# Patient Record
Sex: FEMALE | Race: WHITE | NOT HISPANIC OR LATINO | Employment: OTHER | URBAN - METROPOLITAN AREA
[De-identification: names, ages, dates, MRNs, and addresses within clinical notes are randomized per-mention and may not be internally consistent; named-entity substitution may affect disease eponyms.]

---

## 2019-05-04 PROBLEM — S72.001A CLOSED FRACTURE OF RIGHT HIP: Status: ACTIVE | Noted: 2019-05-04

## 2019-05-04 PROBLEM — E87.1 HYPONATREMIA: Status: ACTIVE | Noted: 2019-05-04

## 2019-05-08 PROBLEM — Z87.81 S/P RIGHT HIP FRACTURE: Status: ACTIVE | Noted: 2019-05-08

## 2022-02-25 ENCOUNTER — PATIENT OUTREACH (OUTPATIENT)
Dept: ADMINISTRATIVE | Facility: HOSPITAL | Age: 72
End: 2022-02-25
Payer: MEDICARE

## 2022-02-25 NOTE — PROGRESS NOTES
Non-compliant report chart audits. Chart review completed for HM test overdue (mammograms, Colonoscopies, pap smears, DM labs, and/or EYE EXAMs)      Care Everywhere and media, updates requested and reviewed.          NEEEDS:  UP TO DATE CLINIC AUTH FORM IN MEDIA-NEED TO REQUEST COLONOSCOPY & EYE EXAM.  MAMMOGRAM  DEPRESSION SCREEN  EYE EXAM  DIABETES URINE SCREEN

## 2022-12-28 PROBLEM — E11.9 DIABETES MELLITUS: Status: ACTIVE | Noted: 2022-12-28

## 2022-12-28 PROBLEM — E03.9 HYPOTHYROIDISM: Status: ACTIVE | Noted: 2022-12-28

## 2022-12-28 PROBLEM — I10 HYPERTENSION: Status: ACTIVE | Noted: 2022-12-28

## 2022-12-28 PROBLEM — H80.90 OTOSCLEROSIS: Status: ACTIVE | Noted: 2022-12-28

## 2023-08-24 ENCOUNTER — PATIENT OUTREACH (OUTPATIENT)
Dept: ADMINISTRATIVE | Facility: HOSPITAL | Age: 73
End: 2023-08-24
Payer: MEDICARE

## 2023-08-24 ENCOUNTER — PATIENT MESSAGE (OUTPATIENT)
Dept: ADMINISTRATIVE | Facility: HOSPITAL | Age: 73
End: 2023-08-24
Payer: MEDICARE

## 2023-09-27 PROBLEM — D50.9 IRON DEFICIENCY ANEMIA: Status: ACTIVE | Noted: 2023-09-27

## 2025-03-18 NOTE — PROGRESS NOTES
"    Patient Name: Serena Bustillo  :1951  73 y.o.      Patient Care Team:  Ayo Lopez MD as PCP - General  Ayo Lopez MD as PCP - Family Medicine  Reji Whittington III, MD as Cardiologist (Cardiology)  Dileep Koch Non-compliant report chart audits for COLON CANCER SCREENING. Chart review completed for HM test overdue (mammograms, Colonoscopies, pap smears, DM labs, and/or EYE EXAMs)      Care Everywhere and media, updates requested and reviewed.      Message sent to patient's portal.        